# Patient Record
Sex: FEMALE | Race: WHITE | Employment: OTHER | URBAN - METROPOLITAN AREA
[De-identification: names, ages, dates, MRNs, and addresses within clinical notes are randomized per-mention and may not be internally consistent; named-entity substitution may affect disease eponyms.]

---

## 2021-10-05 PROBLEM — N20.1 OBSTRUCTION OF RIGHT URETEROPELVIC JUNCTION (UPJ) DUE TO STONE: Status: ACTIVE | Noted: 2017-12-14

## 2021-10-05 PROBLEM — N39.0 URINARY TRACT INFECTION: Status: ACTIVE | Noted: 2017-12-14

## 2021-10-05 PROBLEM — K21.9 GERD (GASTROESOPHAGEAL REFLUX DISEASE): Status: ACTIVE | Noted: 2017-12-14

## 2021-10-05 PROBLEM — B00.9 HSV INFECTION: Status: ACTIVE | Noted: 2017-12-21

## 2021-10-05 PROBLEM — N20.0 KIDNEY STONES: Status: ACTIVE | Noted: 2021-07-22

## 2021-10-05 PROBLEM — N20.1 CALCULUS OF URETER: Status: ACTIVE | Noted: 2017-12-21

## 2021-11-04 PROBLEM — N39.0 URINARY TRACT INFECTION: Status: RESOLVED | Noted: 2017-12-14 | Resolved: 2021-11-04

## 2021-11-15 ENCOUNTER — HOSPITAL ENCOUNTER (OUTPATIENT)
Age: 61
Discharge: HOME | End: 2021-11-15
Payer: COMMERCIAL

## 2021-11-15 DIAGNOSIS — Z01.818 PRE-OP TESTING: ICD-10-CM

## 2021-11-15 LAB
ANION GAP SERPL CALCULATED.3IONS-SCNC: 14 MMOL/L (ref 9–17)
BUN BLDV-MCNC: 11 MG/DL (ref 8–23)
BUN/CREAT BLD: NORMAL (ref 9–20)
CALCIUM SERPL-MCNC: 9.5 MG/DL (ref 8.6–10.4)
CHLORIDE BLD-SCNC: 101 MMOL/L (ref 98–107)
CO2: 25 MMOL/L (ref 20–31)
CREAT SERPL-MCNC: 0.88 MG/DL (ref 0.5–0.9)
GFR AFRICAN AMERICAN: >60 ML/MIN
GFR NON-AFRICAN AMERICAN: >60 ML/MIN
GFR SERPL CREATININE-BSD FRML MDRD: NORMAL ML/MIN/{1.73_M2}
GFR SERPL CREATININE-BSD FRML MDRD: NORMAL ML/MIN/{1.73_M2}
GLUCOSE BLD-MCNC: 96 MG/DL (ref 70–99)
HCT VFR BLD CALC: 46.3 % (ref 36.3–47.1)
HEMOGLOBIN: 15.2 G/DL (ref 11.9–15.1)
MCH RBC QN AUTO: 29.9 PG (ref 25.2–33.5)
MCHC RBC AUTO-ENTMCNC: 32.8 G/DL (ref 28.4–34.8)
MCV RBC AUTO: 91 FL (ref 82.6–102.9)
NRBC AUTOMATED: 0 PER 100 WBC
PDW BLD-RTO: 12.6 % (ref 11.8–14.4)
PLATELET # BLD: 250 K/UL (ref 138–453)
PMV BLD AUTO: 10.7 FL (ref 8.1–13.5)
POTASSIUM SERPL-SCNC: 4.3 MMOL/L (ref 3.7–5.3)
RBC # BLD: 5.09 M/UL (ref 3.95–5.11)
SODIUM BLD-SCNC: 140 MMOL/L (ref 135–144)
WBC # BLD: 10.7 K/UL (ref 3.5–11.3)

## 2021-11-15 PROCEDURE — 80048 BASIC METABOLIC PNL TOTAL CA: CPT

## 2021-11-15 PROCEDURE — 85027 COMPLETE CBC AUTOMATED: CPT

## 2021-11-15 PROCEDURE — 36415 COLL VENOUS BLD VENIPUNCTURE: CPT

## 2021-11-15 NOTE — PROGRESS NOTES
DAY OF SURGERY/PROCEDURE  GUIDELINES    As a patient at the [facility], you can expect quality medical and nursing care that is centered on your individual needs. It is our goal to make your surgical experience as comfortable and excellent as possible.  ________________________________________________________________________    The following instructions are general guidelines, if any information on this sheet is different from what your doctor has instructed you to do, please follow your doctor's instructions. · Please arrive on 11/29 1200 per office      · Enter through entrance C. Check in at registration     · Upon arrival you will be taken to the pre-operative area to get ready for surgery, your family will stay in the waiting room and visit with you once you are ready for surgery. Due to special limitations please limit visitation to 1-2 members of your family at a time. When it is time for surgery your family will return to the waiting room. · You will be given a specific time when you will NOT be able to eat, drink, smoke, suck or chew ANYTHING (no water, gum, mints, cigarettes, cigars, pipes, snuff, chewing tobacco, etc.) or your surgery may be canceled. This will occur during your PAT appointment or by phone 1-2 days before surgery    · Take a shower or bath on the morning of your surgery/procedure (Hibiclens if directed)    · Brush your teeth, but do not swallow any water    · IN CASE OF ILLNESS - If you have a cold or flu symptoms (high fever, runny nose, sore throat, cough, etc.) rash, nausea, vomiting, loose stools, and/or recent contact with someone who has a contagious disease (chick pox, measles, etc.) please call your doctor before coming to the surgery center    · Take a small sip of water with heart, blood pressure, and/or seizure medication the morning of surgery.  Clonidine    · If applicable bring your:  · Inhaler (s)  · Hearing aid(s)  · Eyeglasses and Case (If you wear contacts they have to be removed before surgery, bring case and solution)  · CPAP     · DO NOT take anticoagulants (blood thinners, aspirin or aspirin-containing products) as instructed by your physician. · Wear loose, comfortable clothing that is easy to put on and take off. They will remain in post-op with the nurse. If you will be returning home the same day as your surgery, you will need to have a responsible adult (25years of age or older) present to drive you home. You will need someone stay with you at home for the first 24 hours following your surgery. This is due to the anesthesia and the medication given to you during surgery and recovery.     Get lab work done prior to surgery

## 2021-11-18 ENCOUNTER — HOSPITAL ENCOUNTER (OUTPATIENT)
Age: 61
Discharge: HOME | End: 2021-11-18
Payer: COMMERCIAL

## 2021-11-18 DIAGNOSIS — Z01.818 PRE-OP TESTING: ICD-10-CM

## 2021-11-18 PROCEDURE — 93005 ELECTROCARDIOGRAM TRACING: CPT | Performed by: ANESTHESIOLOGY

## 2021-11-19 LAB
EKG ATRIAL RATE: 54 BPM
EKG P AXIS: 67 DEGREES
EKG P-R INTERVAL: 156 MS
EKG Q-T INTERVAL: 444 MS
EKG QRS DURATION: 86 MS
EKG QTC CALCULATION (BAZETT): 421 MS
EKG R AXIS: 76 DEGREES
EKG T AXIS: 60 DEGREES
EKG VENTRICULAR RATE: 54 BPM

## 2021-11-22 ENCOUNTER — ANESTHESIA EVENT (OUTPATIENT)
Dept: OPERATING ROOM | Age: 61
End: 2021-11-22
Payer: COMMERCIAL

## 2021-11-29 ENCOUNTER — HOSPITAL ENCOUNTER (OUTPATIENT)
Age: 61
Setting detail: OUTPATIENT SURGERY
Discharge: HOME | End: 2021-11-29
Attending: PLASTIC SURGERY | Admitting: PLASTIC SURGERY
Payer: COMMERCIAL

## 2021-11-29 ENCOUNTER — ANESTHESIA (OUTPATIENT)
Dept: OPERATING ROOM | Age: 61
End: 2021-11-29
Payer: COMMERCIAL

## 2021-11-29 VITALS — OXYGEN SATURATION: 100 % | SYSTOLIC BLOOD PRESSURE: 120 MMHG | DIASTOLIC BLOOD PRESSURE: 57 MMHG | TEMPERATURE: 95.5 F

## 2021-11-29 VITALS
TEMPERATURE: 96 F | OXYGEN SATURATION: 100 % | BODY MASS INDEX: 30.13 KG/M2 | DIASTOLIC BLOOD PRESSURE: 90 MMHG | RESPIRATION RATE: 15 BRPM | SYSTOLIC BLOOD PRESSURE: 152 MMHG | WEIGHT: 192 LBS | HEIGHT: 67 IN | HEART RATE: 56 BPM

## 2021-11-29 DIAGNOSIS — Z01.818 PRE-OP TESTING: Primary | ICD-10-CM

## 2021-11-29 DIAGNOSIS — D48.1 NEOPLASM OF UNCERTAIN BEHAVIOR OF CONNECTIVE AND OTHER SOFT TISSUE: ICD-10-CM

## 2021-11-29 PROCEDURE — 7100000000 HC PACU RECOVERY - FIRST 15 MIN: Performed by: PLASTIC SURGERY

## 2021-11-29 PROCEDURE — 2580000003 HC RX 258: Performed by: ANESTHESIOLOGY

## 2021-11-29 PROCEDURE — 2500000003 HC RX 250 WO HCPCS: Performed by: PLASTIC SURGERY

## 2021-11-29 PROCEDURE — 6360000002 HC RX W HCPCS: Performed by: ANESTHESIOLOGY

## 2021-11-29 PROCEDURE — 7100000011 HC PHASE II RECOVERY - ADDTL 15 MIN: Performed by: PLASTIC SURGERY

## 2021-11-29 PROCEDURE — 2500000003 HC RX 250 WO HCPCS: Performed by: ANESTHESIOLOGY

## 2021-11-29 PROCEDURE — 7100000010 HC PHASE II RECOVERY - FIRST 15 MIN: Performed by: PLASTIC SURGERY

## 2021-11-29 PROCEDURE — 3600000013 HC SURGERY LEVEL 3 ADDTL 15MIN: Performed by: PLASTIC SURGERY

## 2021-11-29 PROCEDURE — 88305 TISSUE EXAM BY PATHOLOGIST: CPT

## 2021-11-29 PROCEDURE — 3600000003 HC SURGERY LEVEL 3 BASE: Performed by: PLASTIC SURGERY

## 2021-11-29 PROCEDURE — 2709999900 HC NON-CHARGEABLE SUPPLY: Performed by: PLASTIC SURGERY

## 2021-11-29 PROCEDURE — 3700000000 HC ANESTHESIA ATTENDED CARE: Performed by: PLASTIC SURGERY

## 2021-11-29 PROCEDURE — 3700000001 HC ADD 15 MINUTES (ANESTHESIA): Performed by: PLASTIC SURGERY

## 2021-11-29 RX ORDER — PROPOFOL 10 MG/ML
INJECTION, EMULSION INTRAVENOUS PRN
Status: DISCONTINUED | OUTPATIENT
Start: 2021-11-29 | End: 2021-11-29 | Stop reason: SDUPTHER

## 2021-11-29 RX ORDER — MORPHINE SULFATE 2 MG/ML
2 INJECTION, SOLUTION INTRAMUSCULAR; INTRAVENOUS EVERY 5 MIN PRN
Status: DISCONTINUED | OUTPATIENT
Start: 2021-11-29 | End: 2021-11-29 | Stop reason: HOSPADM

## 2021-11-29 RX ORDER — LABETALOL 20 MG/4 ML (5 MG/ML) INTRAVENOUS SYRINGE
10 EVERY 10 MIN PRN
Status: DISCONTINUED | OUTPATIENT
Start: 2021-11-29 | End: 2021-11-29 | Stop reason: HOSPADM

## 2021-11-29 RX ORDER — PROMETHAZINE HYDROCHLORIDE 25 MG/ML
6.25 INJECTION, SOLUTION INTRAMUSCULAR; INTRAVENOUS
Status: DISCONTINUED | OUTPATIENT
Start: 2021-11-29 | End: 2021-11-29 | Stop reason: HOSPADM

## 2021-11-29 RX ORDER — HYDRALAZINE HYDROCHLORIDE 20 MG/ML
10 INJECTION INTRAMUSCULAR; INTRAVENOUS EVERY 10 MIN PRN
Status: DISCONTINUED | OUTPATIENT
Start: 2021-11-29 | End: 2021-11-29 | Stop reason: HOSPADM

## 2021-11-29 RX ORDER — ONDANSETRON 2 MG/ML
4 INJECTION INTRAMUSCULAR; INTRAVENOUS
Status: DISCONTINUED | OUTPATIENT
Start: 2021-11-29 | End: 2021-11-29 | Stop reason: HOSPADM

## 2021-11-29 RX ORDER — FENTANYL CITRATE 50 UG/ML
INJECTION, SOLUTION INTRAMUSCULAR; INTRAVENOUS PRN
Status: DISCONTINUED | OUTPATIENT
Start: 2021-11-29 | End: 2021-11-29 | Stop reason: SDUPTHER

## 2021-11-29 RX ORDER — ONDANSETRON 2 MG/ML
INJECTION INTRAMUSCULAR; INTRAVENOUS PRN
Status: DISCONTINUED | OUTPATIENT
Start: 2021-11-29 | End: 2021-11-29 | Stop reason: SDUPTHER

## 2021-11-29 RX ORDER — KETOROLAC TROMETHAMINE 30 MG/ML
INJECTION, SOLUTION INTRAMUSCULAR; INTRAVENOUS PRN
Status: DISCONTINUED | OUTPATIENT
Start: 2021-11-29 | End: 2021-11-29 | Stop reason: SDUPTHER

## 2021-11-29 RX ORDER — MIDAZOLAM HYDROCHLORIDE 2 MG/2ML
1 INJECTION, SOLUTION INTRAMUSCULAR; INTRAVENOUS ONCE
Status: DISCONTINUED | OUTPATIENT
Start: 2021-11-29 | End: 2021-11-29 | Stop reason: HOSPADM

## 2021-11-29 RX ORDER — CEFAZOLIN SODIUM 2 G/50ML
SOLUTION INTRAVENOUS PRN
Status: DISCONTINUED | OUTPATIENT
Start: 2021-11-29 | End: 2021-11-29 | Stop reason: SDUPTHER

## 2021-11-29 RX ORDER — MIDAZOLAM HYDROCHLORIDE 1 MG/ML
INJECTION INTRAMUSCULAR; INTRAVENOUS PRN
Status: DISCONTINUED | OUTPATIENT
Start: 2021-11-29 | End: 2021-11-29 | Stop reason: SDUPTHER

## 2021-11-29 RX ORDER — OXYCODONE HYDROCHLORIDE AND ACETAMINOPHEN 5; 325 MG/1; MG/1
1 TABLET ORAL EVERY 6 HOURS PRN
Qty: 10 TABLET | Refills: 0 | Status: SHIPPED | OUTPATIENT
Start: 2021-11-29 | End: 2021-12-06

## 2021-11-29 RX ORDER — SODIUM CHLORIDE 0.9 % (FLUSH) 0.9 %
10 SYRINGE (ML) INJECTION EVERY 12 HOURS SCHEDULED
Status: DISCONTINUED | OUTPATIENT
Start: 2021-11-29 | End: 2021-11-29 | Stop reason: HOSPADM

## 2021-11-29 RX ORDER — CEPHALEXIN 500 MG/1
500 CAPSULE ORAL 3 TIMES DAILY
Qty: 15 CAPSULE | Refills: 0 | Status: SHIPPED | OUTPATIENT
Start: 2021-11-29 | End: 2021-12-04

## 2021-11-29 RX ORDER — SODIUM CHLORIDE 9 MG/ML
25 INJECTION, SOLUTION INTRAVENOUS PRN
Status: DISCONTINUED | OUTPATIENT
Start: 2021-11-29 | End: 2021-11-29 | Stop reason: HOSPADM

## 2021-11-29 RX ORDER — OXYCODONE HYDROCHLORIDE AND ACETAMINOPHEN 5; 325 MG/1; MG/1
1 TABLET ORAL PRN
Status: DISCONTINUED | OUTPATIENT
Start: 2021-11-29 | End: 2021-11-29 | Stop reason: HOSPADM

## 2021-11-29 RX ORDER — DIPHENHYDRAMINE HYDROCHLORIDE 50 MG/ML
12.5 INJECTION INTRAMUSCULAR; INTRAVENOUS
Status: DISCONTINUED | OUTPATIENT
Start: 2021-11-29 | End: 2021-11-29 | Stop reason: HOSPADM

## 2021-11-29 RX ORDER — 0.9 % SODIUM CHLORIDE 0.9 %
500 INTRAVENOUS SOLUTION INTRAVENOUS
Status: DISCONTINUED | OUTPATIENT
Start: 2021-11-29 | End: 2021-11-29 | Stop reason: HOSPADM

## 2021-11-29 RX ORDER — BUPIVACAINE HYDROCHLORIDE AND EPINEPHRINE 5; 5 MG/ML; UG/ML
INJECTION, SOLUTION EPIDURAL; INTRACAUDAL; PERINEURAL PRN
Status: DISCONTINUED | OUTPATIENT
Start: 2021-11-29 | End: 2021-11-29 | Stop reason: ALTCHOICE

## 2021-11-29 RX ORDER — MEPERIDINE HYDROCHLORIDE 50 MG/ML
12.5 INJECTION INTRAMUSCULAR; INTRAVENOUS; SUBCUTANEOUS EVERY 5 MIN PRN
Status: DISCONTINUED | OUTPATIENT
Start: 2021-11-29 | End: 2021-11-29 | Stop reason: HOSPADM

## 2021-11-29 RX ORDER — SODIUM CHLORIDE, SODIUM LACTATE, POTASSIUM CHLORIDE, CALCIUM CHLORIDE 600; 310; 30; 20 MG/100ML; MG/100ML; MG/100ML; MG/100ML
INJECTION, SOLUTION INTRAVENOUS CONTINUOUS
Status: DISCONTINUED | OUTPATIENT
Start: 2021-11-29 | End: 2021-11-29 | Stop reason: HOSPADM

## 2021-11-29 RX ORDER — SODIUM CHLORIDE, SODIUM LACTATE, POTASSIUM CHLORIDE, CALCIUM CHLORIDE 600; 310; 30; 20 MG/100ML; MG/100ML; MG/100ML; MG/100ML
INJECTION, SOLUTION INTRAVENOUS CONTINUOUS PRN
Status: DISCONTINUED | OUTPATIENT
Start: 2021-11-29 | End: 2021-11-29 | Stop reason: SDUPTHER

## 2021-11-29 RX ORDER — SODIUM CHLORIDE 0.9 % (FLUSH) 0.9 %
10 SYRINGE (ML) INJECTION PRN
Status: DISCONTINUED | OUTPATIENT
Start: 2021-11-29 | End: 2021-11-29 | Stop reason: HOSPADM

## 2021-11-29 RX ORDER — LIDOCAINE HYDROCHLORIDE 10 MG/ML
INJECTION, SOLUTION EPIDURAL; INFILTRATION; INTRACAUDAL; PERINEURAL PRN
Status: DISCONTINUED | OUTPATIENT
Start: 2021-11-29 | End: 2021-11-29 | Stop reason: SDUPTHER

## 2021-11-29 RX ORDER — OXYCODONE HYDROCHLORIDE AND ACETAMINOPHEN 5; 325 MG/1; MG/1
2 TABLET ORAL PRN
Status: DISCONTINUED | OUTPATIENT
Start: 2021-11-29 | End: 2021-11-29 | Stop reason: HOSPADM

## 2021-11-29 RX ORDER — SODIUM CHLORIDE 9 MG/ML
INJECTION, SOLUTION INTRAVENOUS CONTINUOUS
Status: DISCONTINUED | OUTPATIENT
Start: 2021-11-29 | End: 2021-11-29 | Stop reason: HOSPADM

## 2021-11-29 RX ADMIN — KETOROLAC TROMETHAMINE 30 MG: 30 INJECTION, SOLUTION INTRAMUSCULAR at 13:50

## 2021-11-29 RX ADMIN — CEFAZOLIN SODIUM 2000 MG: 2 SOLUTION INTRAVENOUS at 13:25

## 2021-11-29 RX ADMIN — SODIUM CHLORIDE, POTASSIUM CHLORIDE, SODIUM LACTATE AND CALCIUM CHLORIDE: 600; 310; 30; 20 INJECTION, SOLUTION INTRAVENOUS at 13:25

## 2021-11-29 RX ADMIN — MIDAZOLAM HYDROCHLORIDE 2 MG: 1 INJECTION, SOLUTION INTRAMUSCULAR; INTRAVENOUS at 13:31

## 2021-11-29 RX ADMIN — PROPOFOL INJECTABLE EMULSION 150 MG: 10 INJECTION, EMULSION INTRAVENOUS at 13:33

## 2021-11-29 RX ADMIN — PROPOFOL INJECTABLE EMULSION 30 MG: 10 INJECTION, EMULSION INTRAVENOUS at 13:40

## 2021-11-29 RX ADMIN — FENTANYL CITRATE 100 MCG: 50 INJECTION, SOLUTION INTRAMUSCULAR; INTRAVENOUS at 13:31

## 2021-11-29 RX ADMIN — LIDOCAINE HYDROCHLORIDE 50 MG: 10 INJECTION, SOLUTION EPIDURAL; INFILTRATION; INTRACAUDAL; PERINEURAL at 13:33

## 2021-11-29 RX ADMIN — ONDANSETRON 4 MG: 2 INJECTION, SOLUTION INTRAMUSCULAR; INTRAVENOUS at 13:50

## 2021-11-29 ASSESSMENT — PULMONARY FUNCTION TESTS
PIF_VALUE: 3
PIF_VALUE: 18
PIF_VALUE: 1
PIF_VALUE: 13
PIF_VALUE: 13
PIF_VALUE: 3
PIF_VALUE: 0
PIF_VALUE: 20
PIF_VALUE: 1
PIF_VALUE: 2
PIF_VALUE: 3
PIF_VALUE: 3
PIF_VALUE: 0
PIF_VALUE: 13
PIF_VALUE: 13
PIF_VALUE: 1
PIF_VALUE: 0
PIF_VALUE: 1
PIF_VALUE: 2
PIF_VALUE: 13
PIF_VALUE: 13
PIF_VALUE: 0
PIF_VALUE: 5
PIF_VALUE: 13
PIF_VALUE: 1
PIF_VALUE: 13
PIF_VALUE: 2
PIF_VALUE: 4
PIF_VALUE: 2

## 2021-11-29 ASSESSMENT — PAIN SCALES - GENERAL
PAINLEVEL_OUTOF10: 0

## 2021-11-29 NOTE — ANESTHESIA POSTPROCEDURE EVALUATION
Department of Anesthesiology  Postprocedure Note    Patient: Jian Mittal  MRN: [id number]  YOB: 1960  Date of evaluation: 11/29/2021  Time:  2:09 PM     Procedure Summary     Date: 11/29/21 Room / Location: [address] [place] [address] N / [address] [facility]    Anesthesia Start: 4792 Anesthesia Stop: 8985    Procedure: LEFT POSTERIOR LOWER LEG CYST BIOPSY EXCISION (Left Leg Lower) Diagnosis: (CYST POSTERIOR LEFT LOWER LEG)    Surgeons: Alex Knox MD Responsible Provider: Ceferino Romero MD    Anesthesia Type: general ASA Status: 2          Anesthesia Type: general    Christopher Phase I: Christopher Score: 8    Christopher Phase II: Christopher Score: 8    Last vitals: Reviewed and per EMR flowsheets.        Anesthesia Post Evaluation    Patient location during evaluation: PACU  Patient participation: complete - patient participated  Level of consciousness: awake and alert  Airway patency: patent  Nausea & Vomiting: no nausea and no vomiting  Complications: no  Cardiovascular status: hemodynamically stable  Respiratory status: face mask and spontaneous ventilation  Hydration status: euvolemic  Multimodal analgesia pain management approach

## 2021-11-29 NOTE — ANESTHESIA PRE PROCEDURE
Department of Anesthesiology  Preprocedure Note       Name:  Percy Steward   Age:  64 y.o.  :  1960                                          MRN:  [id number]         Date:  2021      Surgeon: Priti Verdin):  Becki Pisano MD    Procedure: Procedure(s):  LEFT POSTERIOR LOWER LEG CYST BIOPSY EXCISION    Medications prior to admission:   Prior to Admission medications    Medication Sig Start Date End Date Taking?  Authorizing Provider   diphenhydrAMINE-APAP, sleep, (TYLENOL PM EXTRA STRENGTH)  MG/30ML LIQD Take by mouth   Yes Historical Provider, MD   FIBER PO Take by mouth   Yes Historical Provider, MD   citalopram (CELEXA) 40 MG tablet TAKE 1 TABLET DAILY 21  Yes Historical Provider, MD   cloNIDine (CATAPRES) 0.1 MG tablet Take 0.1 mg by mouth nightly 4/15/21  Yes Historical Provider, MD   omeprazole (PRILOSEC) 20 MG delayed release capsule TAKE ONE CAPSULE BY MOUTH ONE TIME DAILY 21  Yes Historical Provider, MD   rosuvastatin (CRESTOR) 10 MG tablet TAKE 1 TABLET DAILY 11/10/20  Yes Historical Provider, MD       Current medications:    Current Facility-Administered Medications   Medication Dose Route Frequency Provider Last Rate Last Admin    0.9 % sodium chloride infusion   IntraVENous Continuous Elodia Trujillo MD        lactated ringers infusion   IntraVENous Continuous Elodia Trujillo MD        sodium chloride flush 0.9 % injection 10 mL  10 mL IntraVENous 2 times per day Elodia Trujillo MD        sodium chloride flush 0.9 % injection 10 mL  10 mL IntraVENous PRN Elodia Trujillo MD        0.9 % sodium chloride infusion  25 mL IntraVENous PRN Elodia Trujillo MD           Allergies:  No Known Allergies    Problem List:    Patient Active Problem List   Diagnosis Code    Calculus of ureter N20.1    GERD (gastroesophageal reflux disease) K21.9    HSV infection B00.9    Hypercholesterolemia E78.00    Hypertension I10    Kidney stones N20.0    Obstruction of right ureteropelvic junction (UPJ) due to stone N20.1       Past Medical History:        Diagnosis Date    Depression     GERD (gastroesophageal reflux disease)     Hyperlipidemia     Hypertension        Past Surgical History:        Procedure Laterality Date    HAND SURGERY Left     knuckle    HYSTERECTOMY      SHOULDER SURGERY Right     SKIN BIOPSY         Social History:    Social History     Tobacco Use    Smoking status: Never Smoker    Smokeless tobacco: Never Used   Substance Use Topics    Alcohol use: Yes     Comment: \"couple beers a day\"                                Counseling given: Not Answered      Vital Signs (Current):   Vitals:    11/15/21 1035 11/29/21 1248 11/29/21 1256   BP:   (!) 153/94   Pulse:   56   Resp:   16   Temp:   97.6 °F (36.4 °C)   SpO2:   99%   Weight: 195 lb (88.5 kg) 192 lb (87.1 kg)    Height: 5' 7\" (1.702 m) 5' 7\" (1.702 m)                                               BP Readings from Last 3 Encounters:   11/29/21 (!) 153/94       NPO Status:                                                                                 BMI:   Wt Readings from Last 3 Encounters:   11/29/21 192 lb (87.1 kg)     Body mass index is 30.07 kg/m². CBC:   Lab Results   Component Value Date    WBC 10.7 11/15/2021    RBC 5.09 11/15/2021    HGB 15.2 11/15/2021    HCT 46.3 11/15/2021    MCV 91.0 11/15/2021    RDW 12.6 11/15/2021     11/15/2021       CMP:   Lab Results   Component Value Date     11/15/2021    K 4.3 11/15/2021     11/15/2021    CO2 25 11/15/2021    BUN 11 11/15/2021    CREATININE 0.88 11/15/2021    GFRAA >60 11/15/2021    LABGLOM >60 11/15/2021    GLUCOSE 96 11/15/2021    CALCIUM 9.5 11/15/2021       POC Tests: No results for input(s): POCGLU, POCNA, POCK, POCCL, POCBUN, POCHEMO, POCHCT in the last 72 hours.     Coags: No results found for: PROTIME, INR, APTT    HCG (If Applicable): No results found for: PREGTESTUR, PREGSERUM, HCG, HCGQUANT     ABGs: No results found for: PHART, PO2ART, GKA2ICY, QBV0LWJ, BEART, V5NPZTPG     Type & Screen (If Applicable):  No results found for: LABABO, LABRH    Drug/Infectious Status (If Applicable):  No results found for: HIV, HEPCAB    COVID-19 Screening (If Applicable): No results found for: COVID19        Anesthesia Evaluation  Patient summary reviewed and Nursing notes reviewed  Airway: Mallampati: III  TM distance: >3 FB   Neck ROM: full  Mouth opening: > = 3 FB Dental: normal exam         Pulmonary:Negative Pulmonary ROS and normal exam                              ROS comment: -QUIT SMOKING 20 YEARS AGO   Cardiovascular:    (+) hypertension:,       ECG reviewed                     ROS comment: -EKG - SB @ 54     Neuro/Psych:   Negative Neuro/Psych ROS              GI/Hepatic/Renal:   (+) GERD: well controlled,           Endo/Other:                      ROS comment: -MARIJUANA USE  -NPO AFTER MIDNIGHT  -NKDA Abdominal:             Vascular: negative vascular ROS. Other Findings:             Anesthesia Plan      general     ASA 2     (LMA)  Induction: intravenous. MIPS: Postoperative opioids intended and Prophylactic antiemetics administered. Anesthetic plan and risks discussed with patient. Plan discussed with CRNA.     Attending anesthesiologist reviewed and agrees with Aly Pulido MD   11/29/2021

## 2021-11-29 NOTE — OP NOTE
Operative Note      Patient: Piedad Navarro  YOB: 1960  MRN: [id number]    Date of Procedure: 11/29/2021    Pre-Op Diagnosis: CYST POSTERIOR LEFT LOWER LEG    Post-Op Diagnosis: Same       Procedure(s):  LEFT POSTERIOR LOWER LEG CYST BIOPSY EXCISION - 21 mm    Surgeon(s):  Hattie Rodriguez MD    Assistant:   * No surgical staff found *    Anesthesia: General    Estimated Blood Loss (mL): Minimal    Complications: None    Specimens:   ID Type Source Tests Collected by Time Destination   A : LEFT LOWER LEG CYSTIC MASS  Tissue Tissue SURGICAL PATHOLOGY Hattie Rodriguez MD 11/29/2021 1342        Implants:  * No implants in log *      Drains: * No LDAs found *    Findings: A 21 mm very hard cystic mass left posterior calf    Detailed Description of Procedure:   Patient by general anesthesia induced via posterior pharyngeal LMA intubation patient was rolled to the right lateral decubitus position. Cystic back was identified, marked again, and injected with half percent Marcaine 1-200,000 epinephrine local.  Appropriate sterile prep and drape was performed as was a timeout. The lesion had a vertical elliptical excision designed and incised. Circumferential dissection carried out with complete removal.  Multilayer closure using 3-0 Monocryl was performed. Steri-Strips were applied. Tolerated this well.     Electronically signed by Hattie Rodriguez MD on 11/29/2021 at 1:47 PM

## 2021-11-29 NOTE — H&P
History and Physical  Plastic Surgery      Patient's Name/Date of Birth: Yane Escalante / 1960 (89 y.o.)     Date: November 29, 2021      HPI: Pt is a 64 y.o. female who presents to the office today for a cyst on the posterior left lower leg. Patient states she is not certain how long it has been present. It is large, red, draining, and no bleeding since the biopsy. She denies any alleviating factors. She reports a personal history of basal cell carcinoma that was removed on 9/9/2021 in office on her upper back. She denies a family history of skin cancer.     Past Medical History   No past medical history on file.        Past Surgical History   No past surgical history on file.        Current Facility-Administered Medications          Current Outpatient Medications   Medication Sig Dispense Refill    citalopram (CELEXA) 40 MG tablet TAKE 1 TABLET DAILY        cloNIDine (CATAPRES) 0.1 MG tablet Take 0.1 mg by mouth nightly        omeprazole (PRILOSEC) 20 MG delayed release capsule TAKE ONE CAPSULE BY MOUTH ONE TIME DAILY        rosuvastatin (CRESTOR) 10 MG tablet TAKE 1 TABLET DAILY        tamsulosin (FLOMAX) 0.4 MG capsule Take 0.4 mg by mouth daily        bacitracin-polymyxin b (POLYSPORIN) 500-01035 UNIT/GM ointment Apply topically daily.  15 g 1      No current facility-administered medications for this visit.            Family History   No family history on file.        Social History               Socioeconomic History    Marital status:        Spouse name: Not on file    Number of children: Not on file    Years of education: Not on file    Highest education level: Not on file   Occupational History    Not on file   Tobacco Use    Smoking status: Not on file   Substance and Sexual Activity    Alcohol use: Not on file    Drug use: Not on file    Sexual activity: Not on file   Other Topics Concern    Not on file   Social History Narrative    Not on file      Social Determinants of Health          Financial Resource Strain:     Difficulty of Paying Living Expenses:    Food Insecurity:     Worried About Running Out of Food in the Last Year:     [address] in the Last Year:    Transportation Needs:     Lack of Transportation (Medical):  Lack of Transportation (Non-Medical):    Physical Activity:     Days of Exercise per Week:     Minutes of Exercise per Session:    Stress:     Feeling of Stress :    Social Connections:     Frequency of Communication with Friends and Family:     Frequency of Social Gatherings with Friends and Family:     Attends [employer] Services:     Active Member of Clubs or Organizations:     Attends Club or Organization Meetings:     Marital Status:    Intimate Partner Violence:     Fear of Current or Ex-Partner:     Emotionally Abused:     Physically Abused:     Sexually Abused:                ROS:  All systems reviewed. Denies chest pain, shortness of breath, abdominal pain, n/v/d/c. Denies rashes, any upper respiratory illness or fever / chills. Denies dizziness, headaches, tremor. Negative other than those noted above.     No Known Allergies     Physical Exam:  VITALS:  vitals were not taken for this visit. CONSTITUTIONAL:alert & orientated x 3, no acute distress   SKIN:  Warm and dry, no rashes. 2cm erythematous cyst with central ulceration noted to the posterior left lower leg. See assessment below  HEAD:  Normocephalic, atraumatic  EYES: PERRL. EOMs intact. EARS:  Hearing grossly WNL. NOSE:  Nares patent. No rhinorrhea  THROAT:  benign  NECK:supple, no lymphadenopathy  LUNGS: No audible adventitious lung sounds. Patient is breathing normally without issues speaking in full sentences. CARDIOVASCULAR: Heart sounds are normal. Pulses equal bilaterally. Skin color appropriate throughout. Regular rate and rhythm without murmur, gallop or rub.   ABDOMEN: soft, non tender, non distended, no masses or organomegaly EXTREMITIES: no edema bilateral lower extremities         Assessment:  1.     Diagnosis Orders   1. Cyst, dermoid, leg, left            Plan:  1. At this time we discussed the pathology as well as possibility of recurrence in the cyst on her lower leg. She would like to move forward with excision. 2. Self skin checks encouraged, patient advised to watch all existing lesions for changes and observe for any new lesions. Return to the office for evaluation of any concerning, changing or new skin lesions. 3.  Consistent use of sunscreen containing SPF 30 or higher and reapplication every 90 - 278 minutes while outside. 4.  The patient's was evaluated and after discussion surgical and non surgical options, the patient has decided to undergo surgical removal of the cyst. All questions were answered to the patient's satisfaction. We will get her scheduled for excision under General anesthesia. The patient will call Breanna to schedule the procedure. If there are any further questions or concerns, the patient was encouraged to call and follow up with one of the providers. H & P reviewed.  The Patient was examined and there are no changes to the H & P

## 2021-12-01 LAB — SURGICAL PATHOLOGY REPORT: NORMAL

## (undated) DEVICE — ELECTRODE PT RET AD L9FT HI MOIST COND ADH HYDRGEL CORDED

## (undated) DEVICE — STANDARD HYPODERMIC NEEDLE,POLYPROPYLENE HUB: Brand: MONOJECT

## (undated) DEVICE — SOLUTION SURG PREP ANTIMICROBIAL 4 OZ SKIN WND EXIDINE

## (undated) DEVICE — SUTURE MCRYL SZ 3-0 L27IN ABSRB UD L19MM PS-2 3/8 CIR PRIM Y427H

## (undated) DEVICE — PENCIL ES L3M BTTN SWCH HOLSTER W/ BLDE ELECTRD EDGE

## (undated) DEVICE — Z DISCONTINUED BY MEDLINE USE 2711682 TRAY SKIN PREP DRY W/ PREM GLV

## (undated) DEVICE — SYRINGE MED 10ML LUERLOCK TIP W/O SFTY DISP

## (undated) DEVICE — MHPB HAND AND FOOT PACK: Brand: MEDLINE INDUSTRIES, INC.

## (undated) DEVICE — GLOVE ORANGE PI 7 1/2   MSG9075

## (undated) DEVICE — SOLUTION IV IRRIG POUR BRL 0.9% SODIUM CHL 2F7124